# Patient Record
Sex: MALE | Race: OTHER | HISPANIC OR LATINO | URBAN - METROPOLITAN AREA
[De-identification: names, ages, dates, MRNs, and addresses within clinical notes are randomized per-mention and may not be internally consistent; named-entity substitution may affect disease eponyms.]

---

## 2022-10-06 ENCOUNTER — OFFICE (OUTPATIENT)
Dept: URBAN - METROPOLITAN AREA CLINIC 79 | Facility: CLINIC | Age: 57
End: 2022-10-06

## 2022-10-06 VITALS
HEIGHT: 65 IN | TEMPERATURE: 96 F | DIASTOLIC BLOOD PRESSURE: 101 MMHG | SYSTOLIC BLOOD PRESSURE: 150 MMHG | HEART RATE: 113 BPM | WEIGHT: 195.6 LBS

## 2022-10-06 DIAGNOSIS — Z90.49 ACQUIRED ABSENCE OF OTHER SPECIFIED PARTS OF DIGESTIVE TRACT: ICD-10-CM

## 2022-10-06 DIAGNOSIS — K83.8 OTHER SPECIFIED DISEASES OF BILIARY TRACT: ICD-10-CM

## 2022-10-06 DIAGNOSIS — R74.8 ABNORMAL LEVELS OF OTHER SERUM ENZYMES: ICD-10-CM

## 2022-10-06 DIAGNOSIS — K85.90 ACUTE PANCREATITIS WITHOUT NECROSIS OR INFECTION, UNSPECIFIE: ICD-10-CM

## 2022-10-06 PROCEDURE — 99204 OFFICE O/P NEW MOD 45 MIN: CPT

## 2022-10-06 NOTE — SERVICEHPINOTES
58 y/o male following up after ER visit 8/9/22 for abdominal pain. Hx APPY, gallstones (s/p CCY), pancreatitis, HTN.
br Patient reports acute onset epigastric abd pain w radiation to back, assoc w n/v and fever. Notes pain was worse after eating.
br --Labs: WBC 9.3, Hgb 13.7, , AST 32, , Tbili 0.6, Lipase 72.
br --CT abd/pelv w/IV: liver w small cyst o/w normal. s/p cholecystectomy w likely related biliary duct dilation. questionable choledocholithiasis versus debris in the distal common bile duct. pancreas w unremarkable appearance. stomach, small bowel and large bowel normal.
br
br Patient was discharged w omeprazole 20 mg (x21 days) and ondansetron (prn). He reports the pain has not recurred. He has been following a low fat diet. Endorses mild acid reflux that responds well to diet modifications. His bowel habits are normal currently but he does have intermittent constipation depending on diet. No hematochezia or melena. He has not had labs since the hospital. States he does not see PCP often as he is uninsured. No known DM, hypertriglyceridemia. Denies etoh and tobacco use. No fevers, chills, night sweats, unintentional weight loss.br